# Patient Record
Sex: FEMALE | Race: OTHER | Employment: UNEMPLOYED | URBAN - METROPOLITAN AREA
[De-identification: names, ages, dates, MRNs, and addresses within clinical notes are randomized per-mention and may not be internally consistent; named-entity substitution may affect disease eponyms.]

---

## 2019-09-14 ENCOUNTER — HOSPITAL ENCOUNTER (OUTPATIENT)
Age: 49
Discharge: HOME | End: 2019-09-14
Attending: EMERGENCY MEDICINE
Payer: COMMERCIAL

## 2019-09-14 VITALS
SYSTOLIC BLOOD PRESSURE: 162 MMHG | TEMPERATURE: 98 F | BODY MASS INDEX: 27.21 KG/M2 | HEART RATE: 103 BPM | DIASTOLIC BLOOD PRESSURE: 83 MMHG | HEIGHT: 59 IN | OXYGEN SATURATION: 99 % | RESPIRATION RATE: 20 BRPM | WEIGHT: 135 LBS

## 2019-09-14 DIAGNOSIS — N30.01 ACUTE CYSTITIS WITH HEMATURIA: Primary | ICD-10-CM

## 2019-09-14 LAB
BILIRUB UR QL STRIP: NEGATIVE
COLOR UR: YELLOW
GLUCOSE UR STRIP-MCNC: NEGATIVE MG/DL
KETONES UR STRIP-MCNC: NEGATIVE MG/DL
NITRITE UR QL STRIP: NEGATIVE
PH UR STRIP: 6.5 [PH]
PROT UR STRIP-MCNC: NEGATIVE MG/DL
SP GR UR STRIP: 1.02
UROBILINOGEN UR STRIP-ACNC: <2 MG/DL

## 2019-09-14 PROCEDURE — 99204 OFFICE O/P NEW MOD 45 MIN: CPT

## 2019-09-14 PROCEDURE — 87077 CULTURE AEROBIC IDENTIFY: CPT | Performed by: EMERGENCY MEDICINE

## 2019-09-14 PROCEDURE — 81002 URINALYSIS NONAUTO W/O SCOPE: CPT

## 2019-09-14 PROCEDURE — 87186 SC STD MICRODIL/AGAR DIL: CPT | Performed by: EMERGENCY MEDICINE

## 2019-09-14 PROCEDURE — 87086 URINE CULTURE/COLONY COUNT: CPT | Performed by: EMERGENCY MEDICINE

## 2019-09-14 PROCEDURE — 99203 OFFICE O/P NEW LOW 30 MIN: CPT

## 2019-09-14 RX ORDER — FLUCONAZOLE 200 MG/1
200 TABLET ORAL DAILY
Qty: 2 TABLET | Refills: 0 | Status: SHIPPED | OUTPATIENT
Start: 2019-09-14

## 2019-09-14 RX ORDER — LISINOPRIL 10 MG/1
10 TABLET ORAL DAILY
COMMUNITY

## 2019-09-14 RX ORDER — CEPHALEXIN 500 MG/1
500 CAPSULE ORAL 2 TIMES DAILY
Qty: 10 CAPSULE | Refills: 0 | Status: SHIPPED | OUTPATIENT
Start: 2019-09-14 | End: 2019-09-19

## 2019-09-14 NOTE — ED NOTES
Pt ambulatory to the bathroom to provide urine sample. Reports last taking AZO approx 1 week ago.  Pt also took PCN 2 weeks ago

## 2019-09-14 NOTE — ED INITIAL ASSESSMENT (HPI)
Pt presents to the IC with c/o a possible UTI. Pt reports frequency and urgency. Pt notes a constant sensation that she needs to void her bladder. No fevers. Symptoms started approx 14 days ago, intermittently. Hx  Of UTIs.

## 2019-09-14 NOTE — ED PROVIDER NOTES
Patient Seen in: [address]    History   Patient presents with:  Urinary Symptoms (urologic)    Stated Complaint: bladder problem     HPI    Patient complains of urinary frequency, urgency and dysuria that began 14 day distress  CARDIO:regular rate  EXTREMITIES: no cyanosis, clubbing or edema  BACK: no CVA tenderness  GI: soft, mild suprapubic tenderness, no guarding      ED Course     Labs Reviewed   [facility] POCT URINALYSIS DIPSTICK - Abnormal; Notable for the following com

## 2021-09-26 ENCOUNTER — HOSPITAL ENCOUNTER (OUTPATIENT)
Age: 51
Discharge: HOME | End: 2021-09-26
Payer: COMMERCIAL

## 2021-09-26 VITALS
SYSTOLIC BLOOD PRESSURE: 157 MMHG | OXYGEN SATURATION: 99 % | RESPIRATION RATE: 16 BRPM | HEART RATE: 89 BPM | TEMPERATURE: 97 F | DIASTOLIC BLOOD PRESSURE: 91 MMHG

## 2021-09-26 DIAGNOSIS — I10 ESSENTIAL HYPERTENSION: Primary | ICD-10-CM

## 2021-09-26 DIAGNOSIS — Z76.0 ENCOUNTER FOR MEDICATION REFILL: ICD-10-CM

## 2021-09-26 PROCEDURE — 99202 OFFICE O/P NEW SF 15 MIN: CPT | Performed by: PHYSICIAN ASSISTANT

## 2021-09-26 RX ORDER — HYDROCHLOROTHIAZIDE 25 MG/1
25 TABLET ORAL DAILY
Qty: 30 TABLET | Refills: 0 | Status: SHIPPED | OUTPATIENT
Start: 2021-09-26 | End: 2021-10-26

## 2021-09-26 NOTE — ED PROVIDER NOTES
Patient Seen in: [facility]      History   Patient presents with:  Hypertension    Stated Complaint: High blood pressure    Subjective:   HPI    47 yo female with PMH of HTN here medication refill.   Patient takes hydrochlorothiazide for her of motion. Skin:     General: Skin is warm. Neurological:      General: No focal deficit present. Mental Status: She is alert and oriented to person, place, and time.    Psychiatric:         Mood and Affect: Mood normal.         Behavior: Behavior

## 2022-02-08 ENCOUNTER — LAB ENCOUNTER (OUTPATIENT)
Dept: LAB | Facility: HOSPITAL | Age: 52
End: 2022-02-08
Attending: FAMILY MEDICINE
Payer: COMMERCIAL

## 2022-02-08 ENCOUNTER — OFFICE VISIT (OUTPATIENT)
Dept: FAMILY MEDICINE CLINIC | Facility: CLINIC | Age: 52
End: 2022-02-08
Payer: COMMERCIAL

## 2022-02-08 VITALS
WEIGHT: 141 LBS | OXYGEN SATURATION: 95 % | HEIGHT: 59.45 IN | TEMPERATURE: 98 F | BODY MASS INDEX: 28.05 KG/M2 | SYSTOLIC BLOOD PRESSURE: 134 MMHG | HEART RATE: 74 BPM | DIASTOLIC BLOOD PRESSURE: 82 MMHG

## 2022-02-08 DIAGNOSIS — Z12.31 ENCOUNTER FOR SCREENING MAMMOGRAM FOR MALIGNANT NEOPLASM OF BREAST: ICD-10-CM

## 2022-02-08 DIAGNOSIS — I10 ESSENTIAL HYPERTENSION: Primary | ICD-10-CM

## 2022-02-08 DIAGNOSIS — Z00.00 HEALTHCARE MAINTENANCE: ICD-10-CM

## 2022-02-08 DIAGNOSIS — Z12.11 COLON CANCER SCREENING: ICD-10-CM

## 2022-02-08 LAB
ALBUMIN SERPL-MCNC: 4.3 G/DL (ref 3.4–5)
ALBUMIN/GLOB SERPL: 1.1 {RATIO} (ref 1–2)
ALP LIVER SERPL-CCNC: 94 U/L
ALT SERPL-CCNC: 24 U/L
ANION GAP SERPL CALC-SCNC: 5 MMOL/L (ref 0–18)
AST SERPL-CCNC: 17 U/L (ref 15–37)
BASOPHILS # BLD AUTO: 0.04 X10(3) UL (ref 0–0.2)
BASOPHILS NFR BLD AUTO: 0.5 %
BILIRUB SERPL-MCNC: 0.4 MG/DL (ref 0.1–2)
BUN BLD-MCNC: 16 MG/DL (ref 7–18)
BUN/CREAT SERPL: 22.5 (ref 10–20)
CALCIUM BLD-MCNC: 10 MG/DL (ref 8.5–10.1)
CHLORIDE SERPL-SCNC: 106 MMOL/L (ref 98–112)
CHOLEST SERPL-MCNC: 249 MG/DL (ref ?–200)
CO2 SERPL-SCNC: 27 MMOL/L (ref 21–32)
CREAT BLD-MCNC: 0.71 MG/DL
DEPRECATED RDW RBC AUTO: 51.8 FL (ref 35.1–46.3)
EOSINOPHIL # BLD AUTO: 0.06 X10(3) UL (ref 0–0.7)
EOSINOPHIL NFR BLD AUTO: 0.7 %
ERYTHROCYTE [DISTWIDTH] IN BLOOD BY AUTOMATED COUNT: 19 % (ref 11–15)
FASTING PATIENT LIPID ANSWER: YES
FASTING STATUS PATIENT QL REPORTED: YES
GLOBULIN PLAS-MCNC: 3.9 G/DL (ref 2.8–4.4)
GLUCOSE BLD-MCNC: 114 MG/DL (ref 70–99)
HCT VFR BLD AUTO: 34.2 %
HDLC SERPL-MCNC: 71 MG/DL (ref 40–59)
HGB BLD-MCNC: 9.9 G/DL
IMM GRANULOCYTES # BLD AUTO: 0.02 X10(3) UL (ref 0–1)
IMM GRANULOCYTES NFR BLD: 0.2 %
LDLC SERPL CALC-MCNC: 164 MG/DL (ref ?–100)
LYMPHOCYTES # BLD AUTO: 2.21 X10(3) UL (ref 1–4)
LYMPHOCYTES NFR BLD AUTO: 25.5 %
MCH RBC QN AUTO: 22.1 PG (ref 26–34)
MCV RBC AUTO: 76.3 FL
MONOCYTES # BLD AUTO: 0.68 X10(3) UL (ref 0.1–1)
MONOCYTES NFR BLD AUTO: 7.9 %
NEUTROPHILS # BLD AUTO: 5.64 X10 (3) UL (ref 1.5–7.7)
NEUTROPHILS # BLD AUTO: 5.64 X10(3) UL (ref 1.5–7.7)
NEUTROPHILS NFR BLD AUTO: 65.2 %
NONHDLC SERPL-MCNC: 178 MG/DL (ref ?–130)
OSMOLALITY SERPL CALC.SUM OF ELEC: 288 MOSM/KG (ref 275–295)
PLATELET # BLD AUTO: 464 10(3)UL (ref 150–450)
POTASSIUM SERPL-SCNC: 3.9 MMOL/L (ref 3.5–5.1)
PROT SERPL-MCNC: 8.2 G/DL (ref 6.4–8.2)
RBC # BLD AUTO: 4.48 X10(6)UL
SODIUM SERPL-SCNC: 138 MMOL/L (ref 136–145)
TRIGL SERPL-MCNC: 84 MG/DL (ref 30–149)
VLDLC SERPL CALC-MCNC: 16 MG/DL (ref 0–30)
WBC # BLD AUTO: 8.7 X10(3) UL (ref 4–11)

## 2022-02-08 PROCEDURE — 3008F BODY MASS INDEX DOCD: CPT | Performed by: FAMILY MEDICINE

## 2022-02-08 PROCEDURE — 99204 OFFICE O/P NEW MOD 45 MIN: CPT | Performed by: FAMILY MEDICINE

## 2022-02-08 PROCEDURE — 36415 COLL VENOUS BLD VENIPUNCTURE: CPT

## 2022-02-08 PROCEDURE — 85025 COMPLETE CBC W/AUTO DIFF WBC: CPT | Performed by: FAMILY MEDICINE

## 2022-02-08 PROCEDURE — 3075F SYST BP GE 130 - 139MM HG: CPT | Performed by: FAMILY MEDICINE

## 2022-02-08 PROCEDURE — 80061 LIPID PANEL: CPT

## 2022-02-08 PROCEDURE — 3079F DIAST BP 80-89 MM HG: CPT | Performed by: FAMILY MEDICINE

## 2022-02-08 PROCEDURE — 80053 COMPREHEN METABOLIC PANEL: CPT

## 2022-02-08 RX ORDER — LISINOPRIL 20 MG/1
20 TABLET ORAL DAILY
Qty: 90 TABLET | Refills: 0 | Status: SHIPPED | OUTPATIENT
Start: 2022-02-08

## 2022-05-09 ENCOUNTER — TELEPHONE (OUTPATIENT)
Dept: INTERNAL MEDICINE CLINIC | Facility: CLINIC | Age: 52
End: 2022-05-09

## 2022-05-11 RX ORDER — LISINOPRIL 20 MG/1
20 TABLET ORAL DAILY
Qty: 90 TABLET | Refills: 0 | Status: SHIPPED | OUTPATIENT
Start: 2022-05-11

## 2022-08-09 ENCOUNTER — OFFICE VISIT (OUTPATIENT)
Dept: INTERNAL MEDICINE CLINIC | Facility: CLINIC | Age: 52
End: 2022-08-09
Payer: COMMERCIAL

## 2022-08-09 VITALS
TEMPERATURE: 98 F | DIASTOLIC BLOOD PRESSURE: 84 MMHG | WEIGHT: 138 LBS | SYSTOLIC BLOOD PRESSURE: 118 MMHG | BODY MASS INDEX: 27.09 KG/M2 | HEART RATE: 78 BPM | HEIGHT: 60 IN | OXYGEN SATURATION: 98 %

## 2022-08-09 DIAGNOSIS — I10 ESSENTIAL HYPERTENSION: ICD-10-CM

## 2022-08-09 DIAGNOSIS — Z00.00 WELLNESS EXAMINATION: Primary | ICD-10-CM

## 2022-08-09 PROCEDURE — 3079F DIAST BP 80-89 MM HG: CPT | Performed by: FAMILY MEDICINE

## 2022-08-09 PROCEDURE — 3074F SYST BP LT 130 MM HG: CPT | Performed by: FAMILY MEDICINE

## 2022-08-09 PROCEDURE — 3008F BODY MASS INDEX DOCD: CPT | Performed by: FAMILY MEDICINE

## 2022-08-09 PROCEDURE — 99396 PREV VISIT EST AGE 40-64: CPT | Performed by: FAMILY MEDICINE

## 2022-08-09 RX ORDER — LISINOPRIL 20 MG/1
20 TABLET ORAL DAILY
Qty: 90 TABLET | Refills: 1 | Status: SHIPPED | OUTPATIENT
Start: 2022-08-09

## 2022-08-14 PROBLEM — I10 ESSENTIAL HYPERTENSION: Status: ACTIVE | Noted: 2022-08-14

## 2022-09-23 LAB
AMB EXT HEMATOCRIT: 37.8
AMB EXT HEMOGLOBIN: 12.4

## 2022-12-15 ENCOUNTER — TELEPHONE (OUTPATIENT)
Dept: INTERNAL MEDICINE CLINIC | Facility: CLINIC | Age: 52
End: 2022-12-15

## 2022-12-15 NOTE — TELEPHONE ENCOUNTER
Called patient in regards to message left. Patient is going to go to urgent care for swab for covid and flu. Patient didn't test for covid since she didn't have any home test. Patient thought she can just get medications without being seen. Explained that right now doctor not giving out any antibiotics since their is a shortage.

## 2022-12-15 NOTE — TELEPHONE ENCOUNTER
Pt is calling stating that yesterday started with chills,fever,bodyache,sore throat and congestion.  Pt did mention has not done a covid test.      Please call and advise

## 2023-01-27 ENCOUNTER — TELEPHONE (OUTPATIENT)
Dept: INTERNAL MEDICINE CLINIC | Facility: CLINIC | Age: 53
End: 2023-01-27

## 2023-01-27 DIAGNOSIS — D64.9 MILD ANEMIA: Primary | ICD-10-CM

## 2023-01-27 DIAGNOSIS — Z00.00 HEALTHCARE MAINTENANCE: ICD-10-CM

## 2023-01-27 DIAGNOSIS — I10 ESSENTIAL HYPERTENSION: ICD-10-CM

## 2023-01-27 NOTE — TELEPHONE ENCOUNTER
PT CALLED REQUESTING REFILLS FOR lisinopril 20 MG Oral Tab  PT HAS AN UPCOMING APPT 03/27/2023    PLEASE INFORM PT WHEN THIS HAS BEEN SENT

## 2023-01-30 RX ORDER — LISINOPRIL 20 MG/1
20 TABLET ORAL DAILY
Qty: 90 TABLET | Refills: 0 | Status: SHIPPED | OUTPATIENT
Start: 2023-01-30

## 2023-01-30 NOTE — TELEPHONE ENCOUNTER
rx was sent patient was informed, pateint is wondering if you wanto to order labs so she can have them done prior to her visit there is a lab scan from 09/23/2022

## 2023-01-31 DIAGNOSIS — I10 ESSENTIAL HYPERTENSION: ICD-10-CM

## 2023-02-03 RX ORDER — LISINOPRIL 20 MG/1
TABLET ORAL
Qty: 90 TABLET | Refills: 0 | Status: SHIPPED | OUTPATIENT
Start: 2023-02-03

## 2023-02-27 ENCOUNTER — HOSPITAL ENCOUNTER (OUTPATIENT)
Age: 53
Discharge: HOME | End: 2023-02-27
Payer: COMMERCIAL

## 2023-02-27 VITALS
WEIGHT: 140 LBS | DIASTOLIC BLOOD PRESSURE: 87 MMHG | OXYGEN SATURATION: 100 % | HEIGHT: 60 IN | HEART RATE: 92 BPM | RESPIRATION RATE: 16 BRPM | BODY MASS INDEX: 27.48 KG/M2 | SYSTOLIC BLOOD PRESSURE: 170 MMHG | TEMPERATURE: 99 F

## 2023-02-27 DIAGNOSIS — N39.0 ACUTE UTI: Primary | ICD-10-CM

## 2023-02-27 LAB
B-HCG UR QL: NEGATIVE
BILIRUB UR QL STRIP: NEGATIVE
CLARITY UR: CLEAR
COLOR UR: YELLOW
GLUCOSE UR STRIP-MCNC: NEGATIVE MG/DL
KETONES UR STRIP-MCNC: NEGATIVE MG/DL
NITRITE UR QL STRIP: NEGATIVE
PH UR STRIP: 6.5 [PH]
PROT UR STRIP-MCNC: NEGATIVE MG/DL
SP GR UR STRIP: 1.01
UROBILINOGEN UR STRIP-ACNC: <2 MG/DL

## 2023-02-27 PROCEDURE — 81002 URINALYSIS NONAUTO W/O SCOPE: CPT | Performed by: NURSE PRACTITIONER

## 2023-02-27 PROCEDURE — 81025 URINE PREGNANCY TEST: CPT | Performed by: NURSE PRACTITIONER

## 2023-02-27 PROCEDURE — 99213 OFFICE O/P EST LOW 20 MIN: CPT | Performed by: NURSE PRACTITIONER

## 2023-02-27 RX ORDER — NITROFURANTOIN 25; 75 MG/1; MG/1
100 CAPSULE ORAL 2 TIMES DAILY
Qty: 10 CAPSULE | Refills: 0 | Status: SHIPPED | OUTPATIENT
Start: 2023-02-27 | End: 2023-03-04

## 2023-02-27 NOTE — DISCHARGE INSTRUCTIONS
Follow up with your doctor this week to continue evaluation of UTI. Take antibiotic as directed. Finish full amount. Increase water intake, urinate often. RETURN OR GO TO ED For worsening back pain, fevers, worsening abdominal pain nausea and vomiting.

## 2023-02-27 NOTE — ED INITIAL ASSESSMENT (HPI)
Pt presents to the IC with c/o feeling like she has a uti for the last week intermittently. No abd pain or back pain. No fevers.  No n/v/d.

## 2023-03-20 ENCOUNTER — LAB ENCOUNTER (OUTPATIENT)
Dept: LAB | Facility: REFERENCE LAB | Age: 53
End: 2023-03-20
Attending: FAMILY MEDICINE
Payer: COMMERCIAL

## 2023-03-20 DIAGNOSIS — D64.9 MILD ANEMIA: ICD-10-CM

## 2023-03-20 DIAGNOSIS — Z00.00 HEALTHCARE MAINTENANCE: ICD-10-CM

## 2023-03-20 LAB
ALBUMIN SERPL-MCNC: 3.9 G/DL (ref 3.4–5)
ALBUMIN/GLOB SERPL: 0.9 {RATIO} (ref 1–2)
ALP LIVER SERPL-CCNC: 83 U/L
ALT SERPL-CCNC: 20 U/L
ANION GAP SERPL CALC-SCNC: 7 MMOL/L (ref 0–18)
AST SERPL-CCNC: 14 U/L (ref 15–37)
BASOPHILS # BLD AUTO: 0.03 X10(3) UL (ref 0–0.2)
BASOPHILS NFR BLD AUTO: 0.4 %
BILIRUB SERPL-MCNC: 0.6 MG/DL (ref 0.1–2)
BILIRUB UR QL: NEGATIVE
BUN BLD-MCNC: 16 MG/DL (ref 7–18)
BUN/CREAT SERPL: 19.3 (ref 10–20)
CALCIUM BLD-MCNC: 9.8 MG/DL (ref 8.5–10.1)
CHLORIDE SERPL-SCNC: 106 MMOL/L (ref 98–112)
CHOLEST SERPL-MCNC: 218 MG/DL (ref ?–200)
CO2 SERPL-SCNC: 29 MMOL/L (ref 21–32)
COLOR UR: YELLOW
CREAT BLD-MCNC: 0.83 MG/DL
DEPRECATED HBV CORE AB SER IA-ACNC: 6.4 NG/ML
DEPRECATED RDW RBC AUTO: 45.5 FL (ref 35.1–46.3)
EOSINOPHIL # BLD AUTO: 0.13 X10(3) UL (ref 0–0.7)
EOSINOPHIL NFR BLD AUTO: 1.7 %
ERYTHROCYTE [DISTWIDTH] IN BLOOD BY AUTOMATED COUNT: 12.8 % (ref 11–15)
EST. AVERAGE GLUCOSE BLD GHB EST-MCNC: 123 MG/DL (ref 68–126)
FASTING PATIENT LIPID ANSWER: YES
FASTING STATUS PATIENT QL REPORTED: YES
GFR SERPLBLD BASED ON 1.73 SQ M-ARVRAT: 83 ML/MIN/1.73M2 (ref 60–?)
GLOBULIN PLAS-MCNC: 4.5 G/DL (ref 2.8–4.4)
GLUCOSE BLD-MCNC: 115 MG/DL (ref 70–99)
GLUCOSE UR-MCNC: NEGATIVE MG/DL
HBA1C MFR BLD: 5.9 % (ref ?–5.7)
HCT VFR BLD AUTO: 42.9 %
HDLC SERPL-MCNC: 77 MG/DL (ref 40–59)
HGB BLD-MCNC: 13.9 G/DL
IMM GRANULOCYTES # BLD AUTO: 0.02 X10(3) UL (ref 0–1)
IMM GRANULOCYTES NFR BLD: 0.3 %
IRON SATN MFR SERPL: 33 %
IRON SERPL-MCNC: 163 UG/DL
KETONES UR-MCNC: NEGATIVE MG/DL
LDLC SERPL CALC-MCNC: 125 MG/DL (ref ?–100)
LYMPHOCYTES # BLD AUTO: 2.75 X10(3) UL (ref 1–4)
LYMPHOCYTES NFR BLD AUTO: 36.7 %
MCH RBC QN AUTO: 31.3 PG (ref 26–34)
MCHC RBC AUTO-ENTMCNC: 32.4 G/DL (ref 31–37)
MCV RBC AUTO: 96.6 FL
MONOCYTES # BLD AUTO: 0.62 X10(3) UL (ref 0.1–1)
MONOCYTES NFR BLD AUTO: 8.3 %
NEUTROPHILS # BLD AUTO: 3.94 X10 (3) UL (ref 1.5–7.7)
NEUTROPHILS # BLD AUTO: 3.94 X10(3) UL (ref 1.5–7.7)
NEUTROPHILS NFR BLD AUTO: 52.6 %
NITRITE UR QL STRIP.AUTO: NEGATIVE
NONHDLC SERPL-MCNC: 141 MG/DL (ref ?–130)
OSMOLALITY SERPL CALC.SUM OF ELEC: 296 MOSM/KG (ref 275–295)
PH UR: 7 [PH] (ref 5–8)
PLATELET # BLD AUTO: 322 10(3)UL (ref 150–450)
POTASSIUM SERPL-SCNC: 4 MMOL/L (ref 3.5–5.1)
PROT SERPL-MCNC: 8.4 G/DL (ref 6.4–8.2)
PROT UR-MCNC: NEGATIVE MG/DL
RBC # BLD AUTO: 4.44 X10(6)UL
SODIUM SERPL-SCNC: 142 MMOL/L (ref 136–145)
SP GR UR STRIP: 1.02 (ref 1–1.03)
TIBC SERPL-MCNC: 490 UG/DL (ref 240–450)
TRANSFERRIN SERPL-MCNC: 329 MG/DL (ref 200–360)
TRIGL SERPL-MCNC: 90 MG/DL (ref 30–149)
TSI SER-ACNC: 2.19 MIU/ML (ref 0.36–3.74)
UROBILINOGEN UR STRIP-ACNC: <2
VIT C UR-MCNC: NEGATIVE MG/DL
VLDLC SERPL CALC-MCNC: 16 MG/DL (ref 0–30)
WBC # BLD AUTO: 7.5 X10(3) UL (ref 4–11)
WBC #/AREA URNS AUTO: >50 /HPF

## 2023-03-20 PROCEDURE — 84466 ASSAY OF TRANSFERRIN: CPT | Performed by: FAMILY MEDICINE

## 2023-03-20 PROCEDURE — 83540 ASSAY OF IRON: CPT | Performed by: FAMILY MEDICINE

## 2023-03-20 PROCEDURE — 83036 HEMOGLOBIN GLYCOSYLATED A1C: CPT | Performed by: FAMILY MEDICINE

## 2023-03-20 PROCEDURE — 81001 URINALYSIS AUTO W/SCOPE: CPT | Performed by: FAMILY MEDICINE

## 2023-03-20 PROCEDURE — 82728 ASSAY OF FERRITIN: CPT | Performed by: FAMILY MEDICINE

## 2023-03-20 PROCEDURE — 85025 COMPLETE CBC W/AUTO DIFF WBC: CPT | Performed by: FAMILY MEDICINE

## 2023-03-20 PROCEDURE — 80050 GENERAL HEALTH PANEL: CPT | Performed by: FAMILY MEDICINE

## 2023-03-20 PROCEDURE — 80061 LIPID PANEL: CPT | Performed by: FAMILY MEDICINE

## 2023-03-23 ENCOUNTER — TELEPHONE (OUTPATIENT)
Dept: INTERNAL MEDICINE CLINIC | Facility: CLINIC | Age: 53
End: 2023-03-23

## 2023-03-27 ENCOUNTER — OFFICE VISIT (OUTPATIENT)
Dept: INTERNAL MEDICINE CLINIC | Facility: CLINIC | Age: 53
End: 2023-03-27
Payer: COMMERCIAL

## 2023-03-27 VITALS
HEIGHT: 59.45 IN | WEIGHT: 140 LBS | OXYGEN SATURATION: 97 % | BODY MASS INDEX: 27.85 KG/M2 | SYSTOLIC BLOOD PRESSURE: 138 MMHG | DIASTOLIC BLOOD PRESSURE: 86 MMHG | HEART RATE: 96 BPM | TEMPERATURE: 98 F

## 2023-03-27 DIAGNOSIS — D50.9 IRON DEFICIENCY ANEMIA, UNSPECIFIED IRON DEFICIENCY ANEMIA TYPE: ICD-10-CM

## 2023-03-27 DIAGNOSIS — R30.0 DYSURIA: ICD-10-CM

## 2023-03-27 DIAGNOSIS — Z12.11 COLON CANCER SCREENING: ICD-10-CM

## 2023-03-27 DIAGNOSIS — Z12.31 ENCOUNTER FOR SCREENING MAMMOGRAM FOR MALIGNANT NEOPLASM OF BREAST: ICD-10-CM

## 2023-03-27 DIAGNOSIS — I10 ESSENTIAL HYPERTENSION: Primary | ICD-10-CM

## 2023-03-27 PROCEDURE — 99214 OFFICE O/P EST MOD 30 MIN: CPT | Performed by: FAMILY MEDICINE

## 2023-03-27 PROCEDURE — 3075F SYST BP GE 130 - 139MM HG: CPT | Performed by: FAMILY MEDICINE

## 2023-03-27 PROCEDURE — 3008F BODY MASS INDEX DOCD: CPT | Performed by: FAMILY MEDICINE

## 2023-03-27 PROCEDURE — 3079F DIAST BP 80-89 MM HG: CPT | Performed by: FAMILY MEDICINE

## 2023-03-27 PROCEDURE — 87086 URINE CULTURE/COLONY COUNT: CPT | Performed by: FAMILY MEDICINE

## 2023-03-27 PROCEDURE — 87186 SC STD MICRODIL/AGAR DIL: CPT | Performed by: FAMILY MEDICINE

## 2023-03-27 PROCEDURE — 87088 URINE BACTERIA CULTURE: CPT | Performed by: FAMILY MEDICINE

## 2023-03-27 RX ORDER — CEFDINIR 300 MG/1
300 CAPSULE ORAL 2 TIMES DAILY
Qty: 10 CAPSULE | Refills: 0 | Status: SHIPPED | OUTPATIENT
Start: 2023-03-27 | End: 2023-04-01

## 2023-03-27 RX ORDER — FERROUS SULFATE 325(65) MG
325 TABLET ORAL
Qty: 90 TABLET | Refills: 1 | Status: SHIPPED | OUTPATIENT
Start: 2023-03-27

## 2023-03-27 RX ORDER — LISINOPRIL 20 MG/1
20 TABLET ORAL DAILY
Qty: 90 TABLET | Refills: 1 | Status: SHIPPED | OUTPATIENT
Start: 2023-03-27

## 2023-09-05 DIAGNOSIS — I10 ESSENTIAL HYPERTENSION: ICD-10-CM

## 2023-09-05 RX ORDER — LISINOPRIL 20 MG/1
20 TABLET ORAL DAILY
Qty: 90 TABLET | Refills: 0 | Status: SHIPPED | OUTPATIENT
Start: 2023-09-05 | End: 2023-11-08

## 2023-10-10 ENCOUNTER — TELEPHONE (OUTPATIENT)
Dept: INTERNAL MEDICINE CLINIC | Facility: CLINIC | Age: 53
End: 2023-10-10

## 2023-10-10 NOTE — TELEPHONE ENCOUNTER
Pt called requesting refills for lisinopril 20 MG Oral Tab   Pt was only given 60 pills on Sep 5    Pt will need partial refills up to her appt date 01/05/2024    Please call back pt when medication has been sent

## 2023-10-11 NOTE — TELEPHONE ENCOUNTER
Yes as she has many care gaps due. Ok to send 30 tablets and she needs appt sometime before running out. Ok to be a 20min physical if need be.

## 2023-11-06 ENCOUNTER — TELEPHONE (OUTPATIENT)
Dept: INTERNAL MEDICINE CLINIC | Facility: CLINIC | Age: 53
End: 2023-11-06

## 2023-11-06 NOTE — TELEPHONE ENCOUNTER
Patient is calling to get a refill on her lisinopril. She went to her pharmacy and they say there is no more refills. Please advise if refilled.

## 2023-11-06 NOTE — TELEPHONE ENCOUNTER
Spoke to the pharmacy staff patient was dispensed a quantity of 90 tablets on 09/05/2023.     I informed patient of this she states her bottle only has a quantity of 60 tablets, she will take prescription bottle to the pharmacy so they could dispense the additional 30 tablets that are missing

## 2023-11-08 ENCOUNTER — OFFICE VISIT (OUTPATIENT)
Dept: INTERNAL MEDICINE CLINIC | Facility: CLINIC | Age: 53
End: 2023-11-08
Payer: COMMERCIAL

## 2023-11-08 VITALS
HEART RATE: 82 BPM | DIASTOLIC BLOOD PRESSURE: 78 MMHG | WEIGHT: 138 LBS | OXYGEN SATURATION: 99 % | TEMPERATURE: 98 F | HEIGHT: 59.45 IN | BODY MASS INDEX: 27.45 KG/M2 | SYSTOLIC BLOOD PRESSURE: 124 MMHG

## 2023-11-08 DIAGNOSIS — R73.03 PREDIABETES: ICD-10-CM

## 2023-11-08 DIAGNOSIS — I10 ESSENTIAL HYPERTENSION: ICD-10-CM

## 2023-11-08 DIAGNOSIS — Z00.00 WELLNESS EXAMINATION: Primary | ICD-10-CM

## 2023-11-08 DIAGNOSIS — Z23 NEED FOR VACCINATION: ICD-10-CM

## 2023-11-08 DIAGNOSIS — D64.9 MILD ANEMIA: ICD-10-CM

## 2023-11-08 DIAGNOSIS — Z12.11 COLON CANCER SCREENING: ICD-10-CM

## 2023-11-08 PROCEDURE — 90750 HZV VACC RECOMBINANT IM: CPT | Performed by: FAMILY MEDICINE

## 2023-11-08 PROCEDURE — 3078F DIAST BP <80 MM HG: CPT | Performed by: FAMILY MEDICINE

## 2023-11-08 PROCEDURE — 99213 OFFICE O/P EST LOW 20 MIN: CPT | Performed by: FAMILY MEDICINE

## 2023-11-08 PROCEDURE — 90471 IMMUNIZATION ADMIN: CPT | Performed by: FAMILY MEDICINE

## 2023-11-08 PROCEDURE — 99396 PREV VISIT EST AGE 40-64: CPT | Performed by: FAMILY MEDICINE

## 2023-11-08 PROCEDURE — 3074F SYST BP LT 130 MM HG: CPT | Performed by: FAMILY MEDICINE

## 2023-11-08 PROCEDURE — 3008F BODY MASS INDEX DOCD: CPT | Performed by: FAMILY MEDICINE

## 2023-11-08 RX ORDER — LISINOPRIL 20 MG/1
20 TABLET ORAL DAILY
Qty: 90 TABLET | Refills: 1 | Status: SHIPPED | OUTPATIENT
Start: 2023-11-08

## 2024-02-05 DIAGNOSIS — I10 ESSENTIAL HYPERTENSION: ICD-10-CM

## 2024-02-05 RX ORDER — LISINOPRIL 20 MG/1
20 TABLET ORAL DAILY
Qty: 90 TABLET | Refills: 0 | Status: SHIPPED | OUTPATIENT
Start: 2024-02-05 | End: 2024-05-08

## 2024-05-02 ENCOUNTER — LAB ENCOUNTER (OUTPATIENT)
Dept: LAB | Facility: HOSPITAL | Age: 54
End: 2024-05-02
Attending: FAMILY MEDICINE
Payer: COMMERCIAL

## 2024-05-02 DIAGNOSIS — I10 ESSENTIAL HYPERTENSION: ICD-10-CM

## 2024-05-02 DIAGNOSIS — Z00.00 WELLNESS EXAMINATION: ICD-10-CM

## 2024-05-02 DIAGNOSIS — R73.03 PREDIABETES: ICD-10-CM

## 2024-05-02 DIAGNOSIS — D64.9 MILD ANEMIA: ICD-10-CM

## 2024-05-02 LAB
ALBUMIN SERPL-MCNC: 4.4 G/DL (ref 3.2–4.8)
ALBUMIN/GLOB SERPL: 1.3 {RATIO} (ref 1–2)
ALP LIVER SERPL-CCNC: 102 U/L
ALT SERPL-CCNC: 18 U/L
ANION GAP SERPL CALC-SCNC: 3 MMOL/L (ref 0–18)
AST SERPL-CCNC: 20 U/L (ref ?–34)
BASOPHILS # BLD AUTO: 0.03 X10(3) UL (ref 0–0.2)
BASOPHILS NFR BLD AUTO: 0.4 %
BILIRUB SERPL-MCNC: 0.6 MG/DL (ref 0.3–1.2)
BUN BLD-MCNC: 17 MG/DL (ref 9–23)
BUN/CREAT SERPL: 20.7 (ref 10–20)
CALCIUM BLD-MCNC: 9.7 MG/DL (ref 8.7–10.4)
CHLORIDE SERPL-SCNC: 111 MMOL/L (ref 98–112)
CHOLEST SERPL-MCNC: 221 MG/DL (ref ?–200)
CO2 SERPL-SCNC: 28 MMOL/L (ref 21–32)
CREAT BLD-MCNC: 0.82 MG/DL
DEPRECATED HBV CORE AB SER IA-ACNC: 8.7 NG/ML
DEPRECATED RDW RBC AUTO: 45.9 FL (ref 35.1–46.3)
EGFRCR SERPLBLD CKD-EPI 2021: 84 ML/MIN/1.73M2 (ref 60–?)
EOSINOPHIL # BLD AUTO: 0.14 X10(3) UL (ref 0–0.7)
EOSINOPHIL NFR BLD AUTO: 2.1 %
ERYTHROCYTE [DISTWIDTH] IN BLOOD BY AUTOMATED COUNT: 12.9 % (ref 11–15)
EST. AVERAGE GLUCOSE BLD GHB EST-MCNC: 123 MG/DL (ref 68–126)
FASTING PATIENT LIPID ANSWER: YES
FASTING STATUS PATIENT QL REPORTED: YES
GLOBULIN PLAS-MCNC: 3.3 G/DL (ref 2–3.5)
GLUCOSE BLD-MCNC: 103 MG/DL (ref 70–99)
HBA1C MFR BLD: 5.9 % (ref ?–5.7)
HCT VFR BLD AUTO: 40.8 %
HDLC SERPL-MCNC: 63 MG/DL (ref 40–59)
HGB BLD-MCNC: 13 G/DL
IMM GRANULOCYTES # BLD AUTO: 0.01 X10(3) UL (ref 0–1)
IMM GRANULOCYTES NFR BLD: 0.1 %
IRON SATN MFR SERPL: 32 %
IRON SERPL-MCNC: 116 UG/DL
LDLC SERPL CALC-MCNC: 141 MG/DL (ref ?–100)
LYMPHOCYTES # BLD AUTO: 2.72 X10(3) UL (ref 1–4)
LYMPHOCYTES NFR BLD AUTO: 40.7 %
MCH RBC QN AUTO: 30.7 PG (ref 26–34)
MCHC RBC AUTO-ENTMCNC: 31.9 G/DL (ref 31–37)
MCV RBC AUTO: 96.2 FL
MONOCYTES # BLD AUTO: 0.52 X10(3) UL (ref 0.1–1)
MONOCYTES NFR BLD AUTO: 7.8 %
NEUTROPHILS # BLD AUTO: 3.27 X10 (3) UL (ref 1.5–7.7)
NEUTROPHILS # BLD AUTO: 3.27 X10(3) UL (ref 1.5–7.7)
NEUTROPHILS NFR BLD AUTO: 48.9 %
NONHDLC SERPL-MCNC: 158 MG/DL (ref ?–130)
OSMOLALITY SERPL CALC.SUM OF ELEC: 296 MOSM/KG (ref 275–295)
PLATELET # BLD AUTO: 297 10(3)UL (ref 150–450)
POTASSIUM SERPL-SCNC: 4 MMOL/L (ref 3.5–5.1)
PROT SERPL-MCNC: 7.7 G/DL (ref 5.7–8.2)
RBC # BLD AUTO: 4.24 X10(6)UL
SODIUM SERPL-SCNC: 142 MMOL/L (ref 136–145)
TIBC SERPL-MCNC: 368 UG/DL (ref 250–425)
TRANSFERRIN SERPL-MCNC: 247 MG/DL (ref 250–380)
TRIGL SERPL-MCNC: 99 MG/DL (ref 30–149)
TSI SER-ACNC: 2.87 MIU/ML (ref 0.55–4.78)
VLDLC SERPL CALC-MCNC: 18 MG/DL (ref 0–30)
WBC # BLD AUTO: 6.7 X10(3) UL (ref 4–11)

## 2024-05-02 PROCEDURE — 82728 ASSAY OF FERRITIN: CPT

## 2024-05-02 PROCEDURE — 83036 HEMOGLOBIN GLYCOSYLATED A1C: CPT

## 2024-05-02 PROCEDURE — 83540 ASSAY OF IRON: CPT

## 2024-05-02 PROCEDURE — 36415 COLL VENOUS BLD VENIPUNCTURE: CPT | Performed by: FAMILY MEDICINE

## 2024-05-02 PROCEDURE — 80053 COMPREHEN METABOLIC PANEL: CPT

## 2024-05-02 PROCEDURE — 85025 COMPLETE CBC W/AUTO DIFF WBC: CPT | Performed by: FAMILY MEDICINE

## 2024-05-02 PROCEDURE — 84443 ASSAY THYROID STIM HORMONE: CPT

## 2024-05-02 PROCEDURE — 84466 ASSAY OF TRANSFERRIN: CPT

## 2024-05-02 PROCEDURE — 80061 LIPID PANEL: CPT

## 2024-05-08 ENCOUNTER — OFFICE VISIT (OUTPATIENT)
Dept: INTERNAL MEDICINE CLINIC | Facility: CLINIC | Age: 54
End: 2024-05-08
Payer: COMMERCIAL

## 2024-05-08 VITALS
TEMPERATURE: 98 F | DIASTOLIC BLOOD PRESSURE: 82 MMHG | SYSTOLIC BLOOD PRESSURE: 138 MMHG | WEIGHT: 139.38 LBS | OXYGEN SATURATION: 99 % | HEART RATE: 95 BPM | BODY MASS INDEX: 27.73 KG/M2 | HEIGHT: 59.45 IN

## 2024-05-08 DIAGNOSIS — D50.9 IRON DEFICIENCY ANEMIA, UNSPECIFIED IRON DEFICIENCY ANEMIA TYPE: ICD-10-CM

## 2024-05-08 DIAGNOSIS — Z12.31 ENCOUNTER FOR SCREENING MAMMOGRAM FOR MALIGNANT NEOPLASM OF BREAST: ICD-10-CM

## 2024-05-08 DIAGNOSIS — Z12.4 SCREENING FOR CERVICAL CANCER: ICD-10-CM

## 2024-05-08 DIAGNOSIS — Z12.11 COLON CANCER SCREENING: ICD-10-CM

## 2024-05-08 DIAGNOSIS — I10 ESSENTIAL HYPERTENSION: Primary | ICD-10-CM

## 2024-05-08 DIAGNOSIS — Z23 NEED FOR VACCINATION: ICD-10-CM

## 2024-05-08 PROCEDURE — 90471 IMMUNIZATION ADMIN: CPT | Performed by: FAMILY MEDICINE

## 2024-05-08 PROCEDURE — 87624 HPV HI-RISK TYP POOLED RSLT: CPT | Performed by: FAMILY MEDICINE

## 2024-05-08 PROCEDURE — 3079F DIAST BP 80-89 MM HG: CPT | Performed by: FAMILY MEDICINE

## 2024-05-08 PROCEDURE — 3075F SYST BP GE 130 - 139MM HG: CPT | Performed by: FAMILY MEDICINE

## 2024-05-08 PROCEDURE — 90750 HZV VACC RECOMBINANT IM: CPT | Performed by: FAMILY MEDICINE

## 2024-05-08 PROCEDURE — 3008F BODY MASS INDEX DOCD: CPT | Performed by: FAMILY MEDICINE

## 2024-05-08 PROCEDURE — 99214 OFFICE O/P EST MOD 30 MIN: CPT | Performed by: FAMILY MEDICINE

## 2024-05-08 RX ORDER — FERROUS SULFATE 325(65) MG
325 TABLET ORAL
Qty: 90 TABLET | Refills: 1 | Status: SHIPPED | OUTPATIENT
Start: 2024-05-08

## 2024-05-08 RX ORDER — LISINOPRIL 20 MG/1
20 TABLET ORAL DAILY
Qty: 90 TABLET | Refills: 1 | Status: SHIPPED | OUTPATIENT
Start: 2024-05-08

## 2024-05-08 NOTE — PROGRESS NOTES
HPI:     Chief Complaint   Patient presents with    Follow - Up    Blood Pressure    Gyn Exam    Test Results       Clarissa Nettles is a 54 year old female presenting for:      HTN-> asymptomatic    Here to f/u on results    Feeling well. No new concerns      Results for orders placed or performed in visit on 05/02/24   Comp Metabolic Panel (14)   Result Value Ref Range    Glucose 103 (H) 70 - 99 mg/dL    Sodium 142 136 - 145 mmol/L    Potassium 4.0 3.5 - 5.1 mmol/L    Chloride 111 98 - 112 mmol/L    CO2 28.0 21.0 - 32.0 mmol/L    Anion Gap 3 0 - 18 mmol/L    BUN 17 9 - 23 mg/dL    Creatinine 0.82 0.55 - 1.02 mg/dL    BUN/CREA Ratio 20.7 (H) 10.0 - 20.0    Calcium, Total 9.7 8.7 - 10.4 mg/dL    Calculated Osmolality 296 (H) 275 - 295 mOsm/kg    eGFR-Cr 84 >=60 mL/min/1.73m2    ALT 18 10 - 49 U/L    AST 20 <=34 U/L    Alkaline Phosphatase 102 46 - 118 U/L    Bilirubin, Total 0.6 0.3 - 1.2 mg/dL    Total Protein 7.7 5.7 - 8.2 g/dL    Albumin 4.4 3.2 - 4.8 g/dL    Globulin  3.3 2.0 - 3.5 g/dL    A/G Ratio 1.3 1.0 - 2.0    Patient Fasting for CMP? Yes    Hemoglobin A1C   Result Value Ref Range    HgbA1C 5.9 (H) <5.7 %    Estimated Average Glucose 123 68 - 126 mg/dL   Lipid Panel   Result Value Ref Range    Cholesterol, Total 221 (H) <200 mg/dL    HDL Cholesterol 63 (H) 40 - 59 mg/dL    Triglycerides 99 30 - 149 mg/dL    LDL Cholesterol 141 (H) <100 mg/dL    VLDL 18 0 - 30 mg/dL    Non HDL Chol 158 (H) <130 mg/dL    Patient Fasting for Lipid? Yes    TSH W Reflex To Free T4   Result Value Ref Range    TSH 2.867 0.550 - 4.780 mIU/mL   Iron And Tibc [E]   Result Value Ref Range    Iron 116 50 - 170 ug/dL    Transferrin 247 (L) 250 - 380 mg/dL    Total Iron Binding Capacity 368 250 - 425 ug/dL    % Saturation 32 15 - 50 %   Ferritin [E]   Result Value Ref Range    Ferritin 8.7 (L) 18.0 - 340.0 ng/mL       Labs:   Lab Results   Component Value Date/Time    A1C 5.9 (H) 05/02/2024 09:14 AM      Lab Results   Component  Value Date/Time    CHOLEST 221 (H) 2024 09:14 AM    HDL 63 (H) 2024 09:14 AM    TRIG 99 2024 09:14 AM     (H) 2024 09:14 AM    NONHDLC 158 (H) 2024 09:14 AM       Lab Results   Component Value Date/Time     (H) 2024 09:14 AM     2024 09:14 AM    K 4.0 2024 09:14 AM     2024 09:14 AM    CO2 28.0 2024 09:14 AM    CREATSERUM 0.82 2024 09:14 AM    CA 9.7 2024 09:14 AM    ALB 4.4 2024 09:14 AM    TP 7.7 2024 09:14 AM    ALKPHO 102 2024 09:14 AM    AST 20 2024 09:14 AM    ALT 18 2024 09:14 AM    BILT 0.6 2024 09:14 AM    TSH 2.867 2024 09:14 AM          Medications:  Current Outpatient Medications   Medication Sig Dispense Refill    lisinopril 20 MG Oral Tab Take 1 tablet (20 mg total) by mouth daily. 90 tablet 1    Ferrous Sulfate 325 (65 Fe) MG Oral Tab Take 1 tablet (325 mg total) by mouth daily with breakfast. 90 tablet 1      Past Medical History:    Essential hypertension         Past Surgical History:   Procedure Laterality Date           No Known Allergies   Social History:  Social History     Socioeconomic History    Marital status:    Tobacco Use    Smoking status: Light Smoker     Types: Cigarettes    Smokeless tobacco: Never    Tobacco comments:     socially only; few times a month   Substance and Sexual Activity    Alcohol use: Not Currently      Family History:  Family History   Problem Relation Age of Onset    High Blood Pressure Mother     No Known Problems Father           REVIEW OF SYSTEMS:   Review of Systems   Constitutional:  Negative for fatigue and fever.   Respiratory:  Negative for cough and shortness of breath.    Cardiovascular:  Negative for chest pain, palpitations and leg swelling.   Gastrointestinal:  Negative for abdominal pain, constipation, diarrhea and vomiting.   Musculoskeletal:  Negative for arthralgias.   Neurological:  Negative  for headaches.            PHYSICAL EXAM:   /82   Pulse 95   Temp 98.4 °F (36.9 °C)   Ht 4' 11.45\" (1.51 m)   Wt 139 lb 6.4 oz (63.2 kg)   SpO2 99%   BMI 27.73 kg/m²  Estimated body mass index is 27.73 kg/m² as calculated from the following:    Height as of this encounter: 4' 11.45\" (1.51 m).    Weight as of this encounter: 139 lb 6.4 oz (63.2 kg).     Wt Readings from Last 3 Encounters:   05/08/24 139 lb 6.4 oz (63.2 kg)   11/08/23 138 lb (62.6 kg)   03/27/23 140 lb (63.5 kg)       Physical Exam  Vitals reviewed.   Constitutional:       General: She is not in acute distress.     Appearance: She is well-developed.   Cardiovascular:      Rate and Rhythm: Normal rate and regular rhythm.      Heart sounds: Normal heart sounds. No murmur heard.  Pulmonary:      Effort: Pulmonary effort is normal. No respiratory distress.      Breath sounds: Normal breath sounds.   Abdominal:      General: Bowel sounds are normal. There is no distension.      Palpations: Abdomen is soft.      Tenderness: There is no abdominal tenderness.   Genitourinary:     General: Normal vulva.      Vagina: Normal.      Cervix: Normal.      Uterus: Normal.       Adnexa: Right adnexa normal and left adnexa normal.   Musculoskeletal:      Right lower leg: No edema.      Left lower leg: No edema.   Neurological:      General: No focal deficit present.             ASSESSMENT AND PLAN:   Patient is a 54 year old female who presents primarily presents for:    Diagnoses and all orders for this visit:    Essential hypertension  -     lisinopril 20 MG Oral Tab; Take 1 tablet (20 mg total) by mouth daily.  -stable; controlled  -continue meds  -watch salt intake, regular exercise, DASH/heart healthy eating  -monitor home BP regularly and maintain log; if elevated reading >160/100 notify Md.    Screening for cervical cancer  -     ThinPrep PAP Smear; Future  -     Hpv Dna  High Risk , Thin Prep Collect; Future    Encounter for screening mammogram for  malignant neoplasm of breast  -     [facility] CECILE 2D+3D SCREENING BILAT (CPT=77067/50600); Future    Need for vaccination  -     Zoster Recombinant Adjuvanted (Shingrix -Shingles) [[id number]]    Iron deficiency anemia, unspecified iron deficiency anemia type  -     Ferrous Sulfate 325 (65 Fe) MG Oral Tab; Take 1 tablet (325 mg total) by mouth daily with breakfast.  -ferritin still low however normal iron/H&H  -continue iron    Colon cancer screening  -     [facility] Telephone Colon Screen; Future           Return if symptoms worsen or fail to improve.  Patient indicates understanding of the above recommendations and agrees to the above plan.  Reasurrance and education provided. All questions answered.  Notified to call with any questions, complications, allergies, or worsening or changing symptoms as well as any side effects or complications from the treatments .  Red flags/ ER precautions discussed.    Spent 30 minutes including chart review, reviewing appropriate medical history, evaluating patient, discussing treatment options, counseling and education (diet and exercise), ordering appropriate diagnostic tests and completing documentation.        Meds & Refills for this Visit:  Requested Prescriptions     Signed Prescriptions Disp Refills    lisinopril 20 MG Oral Tab 90 tablet 1     Sig: Take 1 tablet (20 mg total) by mouth daily.    Ferrous Sulfate 325 (65 Fe) MG Oral Tab 90 tablet 1     Sig: Take 1 tablet (325 mg total) by mouth daily with breakfast.       Orders Placed This Encounter   Procedures    Hpv Dna  High Risk , Thin Prep Collect    Zoster Recombinant Adjuvanted (Shingrix -Shingles) [[id number]]    ThinPrep PAP Smear       Imaging & Consults:  ZOSTER VACC RECOMBINANT IM NJX  [facility] CECILE 2D+3D SCREENING BILAT (CPT=77067/82454)  OP REFERRAL TO [facility] GI TELEPHONE COLON SCREEN    Health Maintenance:  Health Maintenance   Topic Date Due    Colorectal Cancer Screening  Never done    Mammogram  Never done    COVID-19 Vaccine (1  - 2023-24 season) Never done    Pap Smear  12/17/2023    Tobacco Cessation Counseling  Never done    Zoster Vaccines (2 of 2) 01/03/2024    Influenza Vaccine (Season Ended) 11/08/2024 (Originally 10/1/2024)    DTaP,Tdap,and Td Vaccines (1 - Tdap) 11/08/2024 (Originally 1/16/1989)    Annual Physical  11/08/2024    Annual Depression Screening  Completed    Pneumococcal Vaccine: Birth to 64yrs  Aged Out         Saira Villasenor MD

## 2024-05-09 LAB — HPV I/H RISK 1 DNA SPEC QL NAA+PROBE: NEGATIVE

## 2024-05-14 LAB
.: NORMAL
.: NORMAL

## 2024-11-22 ENCOUNTER — OFFICE VISIT (OUTPATIENT)
Dept: INTERNAL MEDICINE CLINIC | Facility: CLINIC | Age: 54
End: 2024-11-22
Payer: COMMERCIAL

## 2024-11-22 VITALS
WEIGHT: 139 LBS | OXYGEN SATURATION: 98 % | BODY MASS INDEX: 27.65 KG/M2 | HEIGHT: 59.45 IN | DIASTOLIC BLOOD PRESSURE: 70 MMHG | SYSTOLIC BLOOD PRESSURE: 134 MMHG | HEART RATE: 62 BPM | TEMPERATURE: 98 F

## 2024-11-22 DIAGNOSIS — I10 ESSENTIAL HYPERTENSION: Primary | ICD-10-CM

## 2024-11-22 DIAGNOSIS — H69.91 EUSTACHIAN TUBE DYSFUNCTION, RIGHT: ICD-10-CM

## 2024-11-22 DIAGNOSIS — D64.9 MILD ANEMIA: ICD-10-CM

## 2024-11-22 DIAGNOSIS — R73.03 PREDIABETES: ICD-10-CM

## 2024-11-22 PROCEDURE — 3008F BODY MASS INDEX DOCD: CPT | Performed by: FAMILY MEDICINE

## 2024-11-22 PROCEDURE — 99214 OFFICE O/P EST MOD 30 MIN: CPT | Performed by: FAMILY MEDICINE

## 2024-11-22 PROCEDURE — 3078F DIAST BP <80 MM HG: CPT | Performed by: FAMILY MEDICINE

## 2024-11-22 PROCEDURE — 3075F SYST BP GE 130 - 139MM HG: CPT | Performed by: FAMILY MEDICINE

## 2024-11-22 RX ORDER — LISINOPRIL 20 MG/1
20 TABLET ORAL DAILY
Qty: 90 TABLET | Refills: 1 | Status: SHIPPED | OUTPATIENT
Start: 2024-11-22

## 2024-11-22 RX ORDER — OXYMETAZOLINE HYDROCHLORIDE 0.05 G/100ML
1 SPRAY NASAL 2 TIMES DAILY
Qty: 15 ML | Refills: 0 | Status: SHIPPED | OUTPATIENT
Start: 2024-11-22

## 2024-11-22 NOTE — PROGRESS NOTES
HPI:     Chief Complaint   Patient presents with    Medication Follow-Up    Blood Pressure    Ear Problem     Right ear feels clogged       Clarissa Nettles is a 54 year old female presenting for:      HTN-> asymptomatic    Stopped takign iron 3mo ago    Feeling well    Right ear clogged sensation. No pain. Hearing slightly diminished.   For the past 1wk  1wk prior had a bit of a cold    Results for orders placed or performed in visit on 05/08/24   Hpv Dna  High Risk , Thin Prep Collect    Collection Time: 05/08/24 11:06 AM   Result Value Ref Range    HPV High Risk Negative Negative    HPV Source Cervical/endocervical/vaginal    Image-Guided Pap Smear ([facility])    Collection Time: 05/08/24 11:06 AM   Result Value Ref Range    Diagnosis:        NEGATIVE FOR INTRAEPITHELIAL LESION OR MALIGNANCY.  CELLULAR CHANGES ASSOCIATED WITH ATROPHY ARE PRESENT.    Specimen Adequacy: Comment     Performed By: Comment     . .     Note: Comment     Test Methodology: Comment     Clinician provided ICD10: Comment        Labs:   Lab Results   Component Value Date/Time    A1C 5.9 (H) 05/02/2024 09:14 AM      Lab Results   Component Value Date/Time    CHOLEST 221 (H) 05/02/2024 09:14 AM    HDL 63 (H) 05/02/2024 09:14 AM    TRIG 99 05/02/2024 09:14 AM     (H) 05/02/2024 09:14 AM    NONHDLC 158 (H) 05/02/2024 09:14 AM       Lab Results   Component Value Date/Time     (H) 05/02/2024 09:14 AM     05/02/2024 09:14 AM    K 4.0 05/02/2024 09:14 AM     05/02/2024 09:14 AM    CO2 28.0 05/02/2024 09:14 AM    CREATSERUM 0.82 05/02/2024 09:14 AM    CA 9.7 05/02/2024 09:14 AM    ALB 4.4 05/02/2024 09:14 AM    TP 7.7 05/02/2024 09:14 AM    ALKPHO 102 05/02/2024 09:14 AM    AST 20 05/02/2024 09:14 AM    ALT 18 05/02/2024 09:14 AM    BILT 0.6 05/02/2024 09:14 AM    TSH 2.867 05/02/2024 09:14 AM          Medications:  Current Outpatient Medications   Medication Sig Dispense Refill    lisinopril 20 MG Oral Tab Take 1 tablet  (20 mg total) by mouth daily. 90 tablet 1    oxymetazoline (AFRIN 12 HOUR) 0.05 % Nasal Solution 1 spray by Nasal route 2 (two) times daily. 15 mL 0    Ferrous Sulfate 325 (65 Fe) MG Oral Tab Take 1 tablet (325 mg total) by mouth daily with breakfast. 90 tablet 1      Past Medical History:    Essential hypertension         Past Surgical History:   Procedure Laterality Date           No Known Allergies   Social History:  Social History     Socioeconomic History    Marital status:    Tobacco Use    Smoking status: Light Smoker     Types: Cigarettes    Smokeless tobacco: Never    Tobacco comments:     socially only; few times a month   Substance and Sexual Activity    Alcohol use: Not Currently      Family History:  Family History   Problem Relation Age of Onset    High Blood Pressure Mother     No Known Problems Father           REVIEW OF SYSTEMS:   Review of Systems   Constitutional:  Negative for fatigue and fever.   HENT:          Ear pressure   Respiratory:  Negative for cough and shortness of breath.    Cardiovascular:  Negative for chest pain, palpitations and leg swelling.   Gastrointestinal:  Negative for abdominal pain, constipation, diarrhea and vomiting.   Musculoskeletal:  Negative for arthralgias.   Neurological:  Negative for headaches.            PHYSICAL EXAM:   /70   Pulse 62   Temp 98.1 °F (36.7 °C)   Ht 4' 11.45\" (1.51 m)   Wt 139 lb (63 kg)   SpO2 98%   BMI 27.65 kg/m²  Estimated body mass index is 27.65 kg/m² as calculated from the following:    Height as of this encounter: 4' 11.45\" (1.51 m).    Weight as of this encounter: 139 lb (63 kg).     Wt Readings from Last 3 Encounters:   24 139 lb (63 kg)   24 139 lb 6.4 oz (63.2 kg)   23 138 lb (62.6 kg)       Physical Exam  Vitals reviewed.   Constitutional:       General: She is not in acute distress.     Appearance: She is well-developed.   HENT:      Right Ear: A middle ear effusion is present. Tympanic  membrane is retracted.      Left Ear: Tympanic membrane normal.   Cardiovascular:      Rate and Rhythm: Normal rate and regular rhythm.      Heart sounds: Normal heart sounds. No murmur heard.  Pulmonary:      Effort: Pulmonary effort is normal. No respiratory distress.      Breath sounds: Normal breath sounds.   Abdominal:      General: Bowel sounds are normal. There is no distension.      Palpations: Abdomen is soft.      Tenderness: There is no abdominal tenderness.   Genitourinary:     General: Normal vulva.      Vagina: Normal.      Cervix: Normal.      Uterus: Normal.       Adnexa: Right adnexa normal and left adnexa normal.   Musculoskeletal:      Right lower leg: No edema.      Left lower leg: No edema.   Neurological:      General: No focal deficit present.             ASSESSMENT AND PLAN:   Patient is a 54 year old female who presents primarily presents for:    Diagnoses and all orders for this visit:    Essential hypertension  -     lisinopril 20 MG Oral Tab; Take 1 tablet (20 mg total) by mouth daily.  -stable; controlled  -continue meds  -watch salt intake, regular exercise, DASH/heart healthy eating  -monitor home BP regularly and maintain log; if elevated reading >160/100 notify Md.    Prediabetes  -     Hemoglobin A1C; Future  -     Pt counseled with regards to diet and exercise.    Mild anemia  -     CBC With Differential With Platelet  -     Iron And Tibc [E]; Future  -     Ferritin [E]; Future  -has been off iron x3mo    Eustachian tube dysfunction, right  -     oxymetazoline (AFRIN 12 HOUR) 0.05 % Nasal Solution; 1 spray by Nasal route 2 (two) times daily.  -supportive care           Reminded pt of colonoscopy and mammogram  Declined vaccines      Return in about 6 months (around 5/22/2025).  Patient indicates understanding of the above recommendations and agrees to the above plan.  Reasurrance and education provided. All questions answered.  Notified to call with any questions, complications,  allergies, or worsening or changing symptoms as well as any side effects or complications from the treatments .  Red flags/ ER precautions discussed.    Spent 30 minutes including chart review, reviewing appropriate medical history, evaluating patient, discussing treatment options, counseling and education (diet and exercise), ordering appropriate diagnostic tests and completing documentation.        Meds & Refills for this Visit:  Requested Prescriptions     Signed Prescriptions Disp Refills    lisinopril 20 MG Oral Tab 90 tablet 1     Sig: Take 1 tablet (20 mg total) by mouth daily.    oxymetazoline (AFRIN 12 HOUR) 0.05 % Nasal Solution 15 mL 0     Si spray by Nasal route 2 (two) times daily.       Orders Placed This Encounter   Procedures    Hemoglobin A1C    CBC With Differential With Platelet    Iron And Tibc [E]    Ferritin [E]       Imaging & Consults:  None    Health Maintenance:  Health Maintenance   Topic Date Due    Colorectal Cancer Screening  Never done    Mammogram  Never done    COVID-19 Vaccine ( - - season) Never done    Pap Smear  2023    Tobacco Cessation Counseling  Never done    Zoster Vaccines (2 of 2) 2024    Influenza Vaccine (Season Ended) 2024 (Originally 10/1/2024)    DTaP,Tdap,and Td Vaccines (1 - Tdap) 2024 (Originally 1987)    Annual Physical  2024    Annual Depression Screening  Completed    Pneumococcal Vaccine: Birth to 64yrs  Aged Out         Saira Villasenor MD

## 2025-05-07 ENCOUNTER — LAB ENCOUNTER (OUTPATIENT)
Dept: LAB | Facility: HOSPITAL | Age: 55
End: 2025-05-07
Attending: FAMILY MEDICINE
Payer: COMMERCIAL

## 2025-05-07 DIAGNOSIS — D64.9 MILD ANEMIA: ICD-10-CM

## 2025-05-07 DIAGNOSIS — R73.03 PREDIABETES: ICD-10-CM

## 2025-05-07 LAB
BASOPHILS # BLD AUTO: 0.04 X10(3) UL (ref 0–0.2)
BASOPHILS NFR BLD AUTO: 0.5 %
DEPRECATED HBV CORE AB SER IA-ACNC: 8 NG/ML (ref 50–306)
DEPRECATED RDW RBC AUTO: 43.2 FL (ref 35.1–46.3)
EOSINOPHIL # BLD AUTO: 0.18 X10(3) UL (ref 0–0.7)
EOSINOPHIL NFR BLD AUTO: 2.3 %
ERYTHROCYTE [DISTWIDTH] IN BLOOD BY AUTOMATED COUNT: 11.9 % (ref 11–15)
EST. AVERAGE GLUCOSE BLD GHB EST-MCNC: 128 MG/DL (ref 68–126)
HBA1C MFR BLD: 6.1 % (ref ?–5.7)
HCT VFR BLD AUTO: 41.9 % (ref 35–48)
HGB BLD-MCNC: 13.8 G/DL (ref 12–16)
IMM GRANULOCYTES # BLD AUTO: 0.01 X10(3) UL (ref 0–1)
IMM GRANULOCYTES NFR BLD: 0.1 %
IRON SATN MFR SERPL: 32 % (ref 15–50)
IRON SERPL-MCNC: 116 UG/DL (ref 50–170)
LYMPHOCYTES # BLD AUTO: 3.11 X10(3) UL (ref 1–4)
LYMPHOCYTES NFR BLD AUTO: 40 %
MCH RBC QN AUTO: 32.1 PG (ref 26–34)
MCHC RBC AUTO-ENTMCNC: 32.9 G/DL (ref 31–37)
MCV RBC AUTO: 97.4 FL (ref 80–100)
MONOCYTES # BLD AUTO: 0.64 X10(3) UL (ref 0.1–1)
MONOCYTES NFR BLD AUTO: 8.2 %
NEUTROPHILS # BLD AUTO: 3.8 X10 (3) UL (ref 1.5–7.7)
NEUTROPHILS # BLD AUTO: 3.8 X10(3) UL (ref 1.5–7.7)
NEUTROPHILS NFR BLD AUTO: 48.9 %
PLATELET # BLD AUTO: 308 10(3)UL (ref 150–450)
RBC # BLD AUTO: 4.3 X10(6)UL (ref 3.8–5.3)
TOTAL IRON BINDING CAPACITY: 357 UG/DL (ref 250–425)
TRANSFERRIN SERPL-MCNC: 287 MG/DL (ref 250–380)
WBC # BLD AUTO: 7.8 X10(3) UL (ref 4–11)

## 2025-05-07 PROCEDURE — 83540 ASSAY OF IRON: CPT | Performed by: FAMILY MEDICINE

## 2025-05-07 PROCEDURE — 82728 ASSAY OF FERRITIN: CPT | Performed by: FAMILY MEDICINE

## 2025-05-07 PROCEDURE — 85025 COMPLETE CBC W/AUTO DIFF WBC: CPT | Performed by: FAMILY MEDICINE

## 2025-05-07 PROCEDURE — 83036 HEMOGLOBIN GLYCOSYLATED A1C: CPT | Performed by: FAMILY MEDICINE

## 2025-05-07 PROCEDURE — 84466 ASSAY OF TRANSFERRIN: CPT | Performed by: FAMILY MEDICINE

## 2025-05-09 ENCOUNTER — OFFICE VISIT (OUTPATIENT)
Age: 55
End: 2025-05-09
Payer: COMMERCIAL

## 2025-05-09 VITALS
OXYGEN SATURATION: 97 % | HEART RATE: 71 BPM | DIASTOLIC BLOOD PRESSURE: 86 MMHG | WEIGHT: 135 LBS | SYSTOLIC BLOOD PRESSURE: 132 MMHG | BODY MASS INDEX: 26.85 KG/M2 | TEMPERATURE: 98 F | HEIGHT: 59.45 IN

## 2025-05-09 DIAGNOSIS — R73.03 PREDIABETES: ICD-10-CM

## 2025-05-09 DIAGNOSIS — Z12.31 ENCOUNTER FOR SCREENING MAMMOGRAM FOR MALIGNANT NEOPLASM OF BREAST: ICD-10-CM

## 2025-05-09 DIAGNOSIS — D64.9 MILD ANEMIA: ICD-10-CM

## 2025-05-09 DIAGNOSIS — Z12.11 COLON CANCER SCREENING: ICD-10-CM

## 2025-05-09 DIAGNOSIS — I10 ESSENTIAL HYPERTENSION: Primary | ICD-10-CM

## 2025-05-09 DIAGNOSIS — Z00.00 HEALTHCARE MAINTENANCE: ICD-10-CM

## 2025-05-09 DIAGNOSIS — N95.0 POSTMENOPAUSAL BLEEDING: ICD-10-CM

## 2025-05-09 DIAGNOSIS — D50.9 IRON DEFICIENCY ANEMIA, UNSPECIFIED IRON DEFICIENCY ANEMIA TYPE: ICD-10-CM

## 2025-05-09 RX ORDER — FERROUS SULFATE 325(65) MG
325 TABLET ORAL
Qty: 90 TABLET | Refills: 1 | Status: SHIPPED | OUTPATIENT
Start: 2025-05-09

## 2025-05-09 RX ORDER — LISINOPRIL 20 MG/1
20 TABLET ORAL DAILY
Qty: 90 TABLET | Refills: 1 | Status: SHIPPED | OUTPATIENT
Start: 2025-05-09

## 2025-05-09 NOTE — PROGRESS NOTES
HPI:     Chief Complaint   Patient presents with    Follow - Up       Clarissa Nettles is a 55 year old female presenting for:    HTN-> asymptomatic    Low ferritin-> has not been taking iron    HCM  -not interested in vaccines today  -not interested in FIT/c-scope  -has not obtained mammogram as has been busy. Plans to do so \"at some point eventually\"    Few months ago had a recurrence of a normal menstrual cycle after being in menopause for about 1.5yrs. Noted that it started after she took Estroven for about 2wk-1mo and didn't think much of it as thought it was secondary to the medication. Has not had recurrence since. Asymptomatic      Results for orders placed or performed in visit on 05/07/25   Hemoglobin A1C    Collection Time: 05/07/25  9:00 AM   Result Value Ref Range    HgbA1C 6.1 (H) <5.7 %    Estimated Average Glucose 128 (H) 68 - 126 mg/dL   Iron And Tibc [E]    Collection Time: 05/07/25  9:00 AM   Result Value Ref Range    Iron 116 50 - 170 ug/dL    Transferrin 287 250 - 380 mg/dL    Total Iron Binding Capacity 357 250 - 425 ug/dL    % Saturation 32 15 - 50 %   Ferritin [E]    Collection Time: 05/07/25  9:00 AM   Result Value Ref Range    Ferritin 8 (L) 50 - 306 ng/mL       Labs:   Lab Results   Component Value Date/Time    A1C 6.1 (H) 05/07/2025 09:00 AM      Lab Results   Component Value Date/Time    CHOLEST 221 (H) 05/02/2024 09:14 AM    HDL 63 (H) 05/02/2024 09:14 AM    TRIG 99 05/02/2024 09:14 AM     (H) 05/02/2024 09:14 AM    NONHDLC 158 (H) 05/02/2024 09:14 AM       Lab Results   Component Value Date/Time     (H) 05/02/2024 09:14 AM     05/02/2024 09:14 AM    K 4.0 05/02/2024 09:14 AM     05/02/2024 09:14 AM    CO2 28.0 05/02/2024 09:14 AM    CREATSERUM 0.82 05/02/2024 09:14 AM    CA 9.7 05/02/2024 09:14 AM    ALB 4.4 05/02/2024 09:14 AM    TP 7.7 05/02/2024 09:14 AM    ALKPHO 102 05/02/2024 09:14 AM    AST 20 05/02/2024 09:14 AM    ALT 18 05/02/2024 09:14 AM     BILT 0.6 05/02/2024 09:14 AM    TSH 2.867 05/02/2024 09:14 AM          Medications:  Current Medications[1]   Past Medical History[2]      Past Surgical History[3]  Allergies[4]   Social History:  Short Social Hx on File[5]   Family History:  Family History[6]       REVIEW OF SYSTEMS:   Review of Systems   Constitutional:  Negative for fatigue and fever.   Respiratory:  Negative for cough and shortness of breath.    Cardiovascular:  Negative for chest pain, palpitations and leg swelling.   Gastrointestinal:  Negative for abdominal pain, constipation, diarrhea and vomiting.   Genitourinary:  Positive for menstrual problem.   Musculoskeletal:  Negative for arthralgias.   Neurological:  Negative for headaches.            PHYSICAL EXAM:   /86   Pulse 71   Temp 98.1 °F (36.7 °C)   Ht 4' 11.45\" (1.51 m)   Wt 135 lb (61.2 kg)   SpO2 97%   BMI 26.86 kg/m²  Estimated body mass index is 26.86 kg/m² as calculated from the following:    Height as of this encounter: 4' 11.45\" (1.51 m).    Weight as of this encounter: 135 lb (61.2 kg).     Wt Readings from Last 3 Encounters:   05/09/25 135 lb (61.2 kg)   11/22/24 139 lb (63 kg)   05/08/24 139 lb 6.4 oz (63.2 kg)       Physical Exam  Vitals reviewed.   Constitutional:       General: She is not in acute distress.     Appearance: She is well-developed.   Cardiovascular:      Rate and Rhythm: Normal rate and regular rhythm.      Heart sounds: Normal heart sounds. No murmur heard.  Pulmonary:      Effort: Pulmonary effort is normal. No respiratory distress.      Breath sounds: Normal breath sounds.   Abdominal:      General: Bowel sounds are normal. There is no distension.      Palpations: Abdomen is soft.      Tenderness: There is no abdominal tenderness.   Genitourinary:     Comments: Opts to defer until next OV  Musculoskeletal:      Right lower leg: No edema.      Left lower leg: No edema.   Neurological:      General: No focal deficit present.             ASSESSMENT  AND PLAN:   Patient is a 55 year old female who presents primarily presents for:    Diagnoses and all orders for this visit:    Essential hypertension  -     lisinopril 20 MG Oral Tab; Take 1 tablet (20 mg total) by mouth daily.  -stable; controlled  -continue meds  -watch salt intake, regular exercise, DASH/heart healthy eating  -monitor home BP regularly and maintain log; if elevated reading >160/100 notify Md.    Encounter for screening mammogram for malignant neoplasm of breast  -     [facility] CECILE 2D+3D SCREENING BILAT (CPT=77067/88061); Future    Colon cancer screening  -     [facility] GI Telephone Colon Screen; Future    Postmenopausal bleeding  -     US PELVIS (TRANSABDOMINAL AND TRANSVAGINAL) (CPT=76856/41212); Future  -1 episode about 3-6mo ago after 1.5yrs of menopause. No recurrence since  -Last pap 5/2024 neg  -obtain US  -deferred  exam until next OV per pt preference  -pending US will send to [facility] maintenance  -     CBC With Differential With Platelet; Future  -     Comp Metabolic Panel (14); Future  -     Hemoglobin A1C; Future  -     Lipid Panel; Future  -     TSH W Reflex To Free T4; Future  -     Urinalysis, Routine; Future  -     Ferritin [E]; Future  -     Iron And Tibc [E]; Future  -declined vaccines    Mild anemia  Iron deficiency anemia, unspecified iron deficiency anemia type  -     Ferrous Sulfate 325 (65 Fe) MG Oral Tab; Take 1 tablet (325 mg total) by mouth daily with breakfast.  -     Ferritin [E]; Future  -     Iron And Tibc [E]; Future  -restat iron    Prediabetes  -     Hemoglobin A1C; Future               Return in about 6 months (around 11/9/2025) for physical, pap.  Patient indicates understanding of the above recommendations and agrees to the above plan.  Reasurrance and education provided. All questions answered.  Notified to call with any questions, complications, allergies, or worsening or changing symptoms as well as any side effects or complications from the treatments  .  Red flags/ ER precautions discussed.    Spent 40 minutes including chart review, reviewing appropriate medical history, evaluating patient, discussing treatment options, counseling and education (diet and exercise), ordering appropriate diagnostic tests and completing documentation.        Meds & Refills for this Visit:  Requested Prescriptions     Signed Prescriptions Disp Refills    lisinopril 20 MG Oral Tab 90 tablet 1     Sig: Take 1 tablet (20 mg total) by mouth daily.    Ferrous Sulfate 325 (65 Fe) MG Oral Tab 90 tablet 1     Sig: Take 1 tablet (325 mg total) by mouth daily with breakfast.       Orders Placed This Encounter   Procedures    CBC With Differential With Platelet    Comp Metabolic Panel (14)    Hemoglobin A1C    Lipid Panel    TSH W Reflex To Free T4    Urinalysis, Routine    Ferritin [E]    Iron And Tibc [E]       Imaging & Consults:  [facility] CECILE 2D+3D SCREENING BILAT (CPT=77067/99932)  US PELVIS (TRANSABDOMINAL AND TRANSVAGINAL) (CPT=76856/89605)  [facility]FERR[facility] TO [facility] TELEPHONE COLON SCREEN    Health Maintenance:  Health Maintenance   Topic Date Due    Colorectal Cancer Screening  Never done    Mammogram  Never done    DTaP,Tdap,and Td Vaccines (1 - Tdap) Never done    Pneumococcal Vaccine: 50+ Years (1 of 1 - PCV) Never done    COVID-19 Vaccine (1 - 2024-25 season) Never done    Annual Physical  11/08/2024    Annual Depression Screening  01/01/2025    Tobacco Cessation Counseling  Never done    Influenza Vaccine (Season Ended) 10/01/2025    Pap Smear  05/08/2029    Zoster Vaccines  Completed    Meningococcal B Vaccine  Aged Out         Saira Villasenor MD                    [1]   Current Outpatient Medications   Medication Sig Dispense Refill    lisinopril 20 MG Oral Tab Take 1 tablet (20 mg total) by mouth daily. 90 tablet 1    Ferrous Sulfate 325 (65 Fe) MG Oral Tab Take 1 tablet (325 mg total) by mouth daily with breakfast. 90 tablet 1   [2]   Past Medical History:   Essential  hypertension   [3]   Past Surgical History:  Procedure Laterality Date         [4] No Known Allergies  [5]   Social History  Socioeconomic History    Marital status:    Tobacco Use    Smoking status: Light Smoker     Types: Cigarettes    Smokeless tobacco: Never    Tobacco comments:     socially only; few times a month   Substance and Sexual Activity    Alcohol use: Not Currently   [6]   Family History  Problem Relation Age of Onset    High Blood Pressure Mother     No Known Problems Father

## 2025-05-12 ENCOUNTER — TELEPHONE (OUTPATIENT)
Dept: GASTROENTEROLOGY | Facility: CLINIC | Age: 55
End: 2025-05-12

## 2025-05-12 NOTE — TELEPHONE ENCOUNTER
1st attempt to schedule telephone colon screening.     Left message to call back.     Plan to await call back or follow up.     [facility] Staff     Please schedule TCS appointment upon return call.     Thank you!

## (undated) DIAGNOSIS — I10 ESSENTIAL HYPERTENSION: ICD-10-CM